# Patient Record
Sex: MALE | Race: WHITE | Employment: OTHER | URBAN - METROPOLITAN AREA
[De-identification: names, ages, dates, MRNs, and addresses within clinical notes are randomized per-mention and may not be internally consistent; named-entity substitution may affect disease eponyms.]

---

## 2024-01-01 ENCOUNTER — HOSPITAL ENCOUNTER (EMERGENCY)
Age: 74
End: 2024-03-20
Attending: EMERGENCY MEDICINE
Payer: MEDICARE

## 2024-01-01 VITALS
HEIGHT: 71 IN | SYSTOLIC BLOOD PRESSURE: 112 MMHG | DIASTOLIC BLOOD PRESSURE: 40 MMHG | RESPIRATION RATE: 26 BRPM | WEIGHT: 220 LBS | BODY MASS INDEX: 30.8 KG/M2

## 2024-01-01 DIAGNOSIS — I46.9 CARDIAC ARREST (HCC): Primary | ICD-10-CM

## 2024-01-01 PROCEDURE — 92950 HEART/LUNG RESUSCITATION CPR: CPT

## 2024-01-01 PROCEDURE — 36556 INSERT NON-TUNNEL CV CATH: CPT

## 2024-01-01 PROCEDURE — 2700000000 HC OXYGEN THERAPY PER DAY

## 2024-01-01 PROCEDURE — 2500000003 HC RX 250 WO HCPCS: Performed by: EMERGENCY MEDICINE

## 2024-01-01 PROCEDURE — 99285 EMERGENCY DEPT VISIT HI MDM: CPT

## 2024-01-01 PROCEDURE — 94761 N-INVAS EAR/PLS OXIMETRY MLT: CPT

## 2024-01-01 PROCEDURE — 6360000002 HC RX W HCPCS: Performed by: EMERGENCY MEDICINE

## 2024-01-01 RX ORDER — EPINEPHRINE IN SOD CHLOR,ISO 1 MG/10 ML
SYRINGE (ML) INTRAVENOUS DAILY PRN
Status: COMPLETED | OUTPATIENT
Start: 2024-01-01 | End: 2024-01-01

## 2024-01-01 RX ORDER — CALCIUM CHLORIDE 100 MG/ML
INJECTION INTRAVENOUS; INTRAVENTRICULAR DAILY PRN
Status: COMPLETED | OUTPATIENT
Start: 2024-01-01 | End: 2024-01-01

## 2024-01-01 RX ADMIN — SODIUM BICARBONATE 50 MEQ: 84 INJECTION, SOLUTION INTRAVENOUS at 06:14

## 2024-01-01 RX ADMIN — EPINEPHRINE 1 MG: 0.1 INJECTION INTRACARDIAC; INTRAVENOUS at 06:18

## 2024-01-01 RX ADMIN — CALCIUM CHLORIDE 1000 MG: 100 INJECTION, SOLUTION INTRAVENOUS; INTRAVENTRICULAR at 06:15

## 2024-01-01 RX ADMIN — EPINEPHRINE 1 MG: 0.1 INJECTION INTRACARDIAC; INTRAVENOUS at 06:08

## 2024-01-01 RX ADMIN — EPINEPHRINE 1 MG: 0.1 INJECTION INTRACARDIAC; INTRAVENOUS at 06:14

## 2024-01-01 RX ADMIN — EPINEPHRINE 1 MG: 0.1 INJECTION INTRACARDIAC; INTRAVENOUS at 06:05

## 2024-01-01 RX ADMIN — EPINEPHRINE 1 MG: 0.1 INJECTION INTRACARDIAC; INTRAVENOUS at 06:11

## 2024-03-20 NOTE — ED NOTES
Patient arrived by EMS [facility] cpr in progress with Mart #3. Ems states patient was found down unresponsive 4 ml of EPI given, amiodarone 450 given. EMS states patient was in V-FiB and shocked 5 times.  EMS states patient was down at 5:05 and cpr started at 0525. EMS also states patient has a hematoma on the back of his head.

## 2024-03-20 NOTE — PROGRESS NOTES
SPIRITUAL CARE DEPARTMENT - [facility]   Patient Death Note  DEATH                  Room # STA23/23   Name: Doug Rojas            Age: 73 y.o.  Gender: male          Latter day: Non-Pentecostal      Place of Gnosticist: None  Admit Date & Time: 3/20/2024  6:05 AM        Actual date of death: 3/20/2024   TOD: 06:20am       Referral:  Unit: Emergency Department  Nurse: RN Joleen Schwab    SITUATION AT DEATH:  Patient arrived to the ED via EMS as a Cardiac Arrest.    IS THIS A 'S CASE?  Yes    SPIRITUAL/EMOTIONAL INTERVENTION:  Writer was notified by night  that the patient in ED was brought in as a cardiac arrest, and has passed (writer stated he will provide service for the family). Writer arrived to the ER department and was debriefed by RAMILA Baez, and was also notified that the patient is a 's case, and family are at the bedside.    Writer entered the room and was greeted by the spouse (Hayde), and sister of the spouse (Elo), as he gave his condolence for the loss of their love one. The writer and family members engaged in conversation as they explained the events that led up to the passing of the patient. The spouse talks about their years of marriage (52), as well as the character of the patient (very loving and nice person; spouse and patient have no children).    The spouse shares that they currently don't attend [employer] or have a [employer] home, but has a strong alexander and belief in God. The writer was able to provide words of comfort, as well as a prayer of celebration for the life of the patient. The family is thankful for the prayer and support. A  home was discussed and chosen ([facility]  [facility] [place] [phone]).    The writer assisted the RN in completing the release of body information, and submitted the necessary document to security. The family members gave their final goodbyes prior to the arrival of the , and was escorted to the ED

## 2024-03-20 NOTE — ED PROVIDER NOTES
placed in this encounter.    DISCHARGE PRESCRIPTIONS:  New Prescriptions    No medications on file     PHYSICIAN CONSULTS ORDERED THIS ENCOUNTER:  None  FINAL IMPRESSION      1. Cardiac arrest (HCC)          DISPOSITION/PLAN   DISPOSITION  2024 06:32:12 AM      OUTPATIENT FOLLOW UP THE PATIENT:  No follow-up provider specified.    DO Zina De Anda Sami, DO  [phone]